# Patient Record
Sex: FEMALE | Race: WHITE | URBAN - METROPOLITAN AREA
[De-identification: names, ages, dates, MRNs, and addresses within clinical notes are randomized per-mention and may not be internally consistent; named-entity substitution may affect disease eponyms.]

---

## 2023-09-20 ENCOUNTER — HOME HEALTH ADMISSION (OUTPATIENT)
Dept: HOME HEALTH SERVICES | Facility: HOME HEALTH | Age: 83
End: 2023-09-20

## 2023-10-13 ENCOUNTER — HOME HEALTH ADMISSION (OUTPATIENT)
Dept: HOME HEALTH SERVICES | Facility: HOME HEALTH | Age: 83
End: 2023-10-13
Payer: MEDICARE

## 2023-10-16 ENCOUNTER — HOME CARE VISIT (OUTPATIENT)
Dept: SCHEDULING | Facility: HOME HEALTH | Age: 83
End: 2023-10-16
Payer: MEDICARE

## 2023-10-16 VITALS
TEMPERATURE: 97.5 F | HEART RATE: 60 BPM | OXYGEN SATURATION: 97 % | RESPIRATION RATE: 18 BRPM | DIASTOLIC BLOOD PRESSURE: 76 MMHG | SYSTOLIC BLOOD PRESSURE: 118 MMHG

## 2023-10-16 VITALS
OXYGEN SATURATION: 97 % | HEART RATE: 60 BPM | DIASTOLIC BLOOD PRESSURE: 76 MMHG | TEMPERATURE: 97.5 F | BODY MASS INDEX: 27.48 KG/M2 | SYSTOLIC BLOOD PRESSURE: 118 MMHG | WEIGHT: 140 LBS | HEIGHT: 60 IN | RESPIRATION RATE: 18 BRPM

## 2023-10-16 PROCEDURE — G0151 HHCP-SERV OF PT,EA 15 MIN: HCPCS

## 2023-10-16 PROCEDURE — G0299 HHS/HOSPICE OF RN EA 15 MIN: HCPCS

## 2023-10-16 ASSESSMENT — ENCOUNTER SYMPTOMS
BOWEL INCONTINENCE: 1
DYSPNEA ACTIVITY LEVEL: AFTER AMBULATING LESS THAN 10 FT
PAIN LOCATION - PAIN QUALITY: ACHY
HEMOPTYSIS: 0

## 2023-10-18 ENCOUNTER — HOME CARE VISIT (OUTPATIENT)
Dept: SCHEDULING | Facility: HOME HEALTH | Age: 83
End: 2023-10-18
Payer: MEDICARE

## 2023-10-18 VITALS
RESPIRATION RATE: 18 BRPM | SYSTOLIC BLOOD PRESSURE: 122 MMHG | DIASTOLIC BLOOD PRESSURE: 68 MMHG | TEMPERATURE: 97.8 F | OXYGEN SATURATION: 94 %

## 2023-10-18 PROCEDURE — G0157 HHC PT ASSISTANT EA 15: HCPCS

## 2023-10-18 ASSESSMENT — ENCOUNTER SYMPTOMS: PAIN LOCATION - PAIN QUALITY: ACHE

## 2023-10-18 NOTE — HOME HEALTH
Pt is an 83 yo RHD female referred to PT s/p pulmonary embolism. Pt was sitting in her WC upon PT arrival and her daughter was present for eval. Pt lives alone in a single story home with one step into shower. PLOF: Pt amb with rollator walker in her home and was I with ADLs. Pt was in the hospital and attended rehab and was readmitted to the hosptal and then back to rehab. Pt presents with forward head, forward lumbar flexion with standing. Pt is non-ambulatory at this time. Pt is mod/max A with sit to stand transfers and presents with increased fatique with today's tests. Pt demonstrates BLE muscle weakness, impaired dynamic balance, limited standing tolerance and decreased endurance causing difficulty performing safe functional transfers from various surfaces throughout home, standing activities and bed mobility without assistance as able to do in PLOF. Tenetti 5 which indicates a high fall risk. Pt is homebound due to being at high risk for falls and requires the assistance of another person to leave home safely. Today's treatment included HEP insturction of LAQ x 20, hip flexion x 20 and modified sit to stand transfer x 10. rolling walker

## 2023-10-19 ENCOUNTER — HOME CARE VISIT (OUTPATIENT)
Dept: SCHEDULING | Facility: HOME HEALTH | Age: 83
End: 2023-10-19
Payer: MEDICARE

## 2023-10-19 VITALS
HEART RATE: 71 BPM | TEMPERATURE: 97.5 F | RESPIRATION RATE: 16 BRPM | OXYGEN SATURATION: 96 % | SYSTOLIC BLOOD PRESSURE: 125 MMHG | DIASTOLIC BLOOD PRESSURE: 67 MMHG

## 2023-10-19 PROCEDURE — G0299 HHS/HOSPICE OF RN EA 15 MIN: HCPCS

## 2023-10-19 ASSESSMENT — ENCOUNTER SYMPTOMS
BOWEL INCONTINENCE: 1
COUGH CHARACTERISTICS: NON-PRODUCTIVE
PAIN LOCATION - PAIN QUALITY: ACHE
COUGH: 1

## 2023-10-20 ENCOUNTER — HOME CARE VISIT (OUTPATIENT)
Dept: HOME HEALTH SERVICES | Facility: HOME HEALTH | Age: 83
End: 2023-10-20
Payer: MEDICARE

## 2023-10-20 PROCEDURE — G0157 HHC PT ASSISTANT EA 15: HCPCS

## 2023-10-20 NOTE — HOME HEALTH
[address] Patient seen today for continued respiratory monitoring (s/p recent PE)     Pt resting in bed with NAD, A/O x 4, c/o minute pain in right thigh (post workout pt states), bilateral calves negative for Yolette's, with no redness heat or swelling, S1 S2 audible, lungs CTA upon auscultation, RA, pt denies any pain/SOB upon inspiration, moist/nonproductive cough present, bowel sounds active x 4 quad ABD which is soft, rounded and non-tender, (BM today) pt is incontinent of B/B, visible skin intact (pt denies any wounds),  pt is currently non ambulatory with weakness in BLE, she utilizes a manual wheelchair         Caregiver involvement: Pt resides 24/7 in home with Daughter Emmett Woo who assists with ADL, Meds, Medical care      Medications reconciled and all medications are available     Education provided this visit: SN educated patient/patient's caregiver on Medication (to include high risk meds i.e., hyperglycemics, anticoagulants, cardiac and CNS affective meds), DM/Hyper/hypo glycemia, risks/causes of pulmonary embolism, SOB, Edema, risk /prevention of pressure injury, safety and fall prevention.      Patient/caregiver verbalize comprehension and demonstrated/taught back on topics     Progress toward goals: progressing well     Home exercise program: PT     Plan for next visit: Eval/Education      Discharge planning was discussed with patient/caregiver: DC when goals are met

## 2023-10-22 VITALS
RESPIRATION RATE: 17 BRPM | SYSTOLIC BLOOD PRESSURE: 142 MMHG | TEMPERATURE: 97.1 F | DIASTOLIC BLOOD PRESSURE: 72 MMHG | HEART RATE: 84 BPM

## 2023-10-23 ENCOUNTER — HOME CARE VISIT (OUTPATIENT)
Dept: SCHEDULING | Facility: HOME HEALTH | Age: 83
End: 2023-10-23
Payer: MEDICARE

## 2023-10-23 VITALS
TEMPERATURE: 97.7 F | RESPIRATION RATE: 17 BRPM | DIASTOLIC BLOOD PRESSURE: 72 MMHG | SYSTOLIC BLOOD PRESSURE: 128 MMHG | OXYGEN SATURATION: 93 % | HEART RATE: 72 BPM

## 2023-10-23 PROCEDURE — G0157 HHC PT ASSISTANT EA 15: HCPCS

## 2023-10-23 PROCEDURE — G0300 HHS/HOSPICE OF LPN EA 15 MIN: HCPCS

## 2023-10-23 ASSESSMENT — ENCOUNTER SYMPTOMS: DYSPNEA ACTIVITY LEVEL: AFTER AMBULATING LESS THAN 10 FT

## 2023-10-23 NOTE — HOME HEALTH
Patient with medication education  Wound Care Provided per care plan. Pt tolerated well. Caregiver involvement: Family involved  Medications reconciled and all medications are available in patient home  Home health supplies by type and quantity ordered/delivered this visit include: n/a  Patient education provided this visit: SN educated patient and patient's caregiver on fall precautions and proper nutrition. Patient and patient caregiver verbalizes understanding via the teach back method. Progress toward goals: progressing well  Home exercise program:   Plan for next visit: education  The following discharge planning was discussed with the patient/ patient's caregiver: DC when goals met. Patient a&ox4. Resting in bed upon arrival. Patient denies pain. Patient denies recent falls. Patient denies new complaints. VSS.

## 2023-10-24 ENCOUNTER — HOME CARE VISIT (OUTPATIENT)
Dept: HOME HEALTH SERVICES | Facility: HOME HEALTH | Age: 83
End: 2023-10-24
Payer: MEDICARE

## 2023-10-24 VITALS
TEMPERATURE: 97.3 F | OXYGEN SATURATION: 94 % | SYSTOLIC BLOOD PRESSURE: 120 MMHG | RESPIRATION RATE: 17 BRPM | DIASTOLIC BLOOD PRESSURE: 70 MMHG

## 2023-10-24 PROCEDURE — G0152 HHCP-SERV OF OT,EA 15 MIN: HCPCS

## 2023-10-26 ENCOUNTER — HOME CARE VISIT (OUTPATIENT)
Dept: SCHEDULING | Facility: HOME HEALTH | Age: 83
End: 2023-10-26
Payer: MEDICARE

## 2023-10-26 VITALS — RESPIRATION RATE: 17 BRPM | HEART RATE: 65 BPM | TEMPERATURE: 97.3 F | OXYGEN SATURATION: 95 %

## 2023-10-26 VITALS — DIASTOLIC BLOOD PRESSURE: 70 MMHG | SYSTOLIC BLOOD PRESSURE: 124 MMHG | RESPIRATION RATE: 17 BRPM | HEART RATE: 75 BPM

## 2023-10-26 PROCEDURE — G0157 HHC PT ASSISTANT EA 15: HCPCS

## 2023-10-26 NOTE — HOME HEALTH
Patient is an 81 y/o female who has multiple medical dx and resides at home with her daughter. She currently reuires up to max assist for LB dressing and assist for transitional movements and transfers as well. She has functional strength but could benefit fron BUE HEP to continue to use UE and continue UE mobility. She has potential to assist more with UB self-care with appropriate education and training. Plan to educate patient and her daughter who is her primary care giver on tasks and activities to encourge functional activities as well as functinal use of hands for self-care. Goals established based on patient and family input to improve function.

## 2023-10-27 ENCOUNTER — HOME CARE VISIT (OUTPATIENT)
Dept: SCHEDULING | Facility: HOME HEALTH | Age: 83
End: 2023-10-27
Payer: MEDICARE

## 2023-10-27 VITALS
SYSTOLIC BLOOD PRESSURE: 132 MMHG | RESPIRATION RATE: 17 BRPM | HEART RATE: 58 BPM | TEMPERATURE: 97.6 F | DIASTOLIC BLOOD PRESSURE: 72 MMHG | OXYGEN SATURATION: 96 %

## 2023-10-27 PROCEDURE — G0152 HHCP-SERV OF OT,EA 15 MIN: HCPCS

## 2023-10-27 PROCEDURE — G0300 HHS/HOSPICE OF LPN EA 15 MIN: HCPCS

## 2023-10-27 PROCEDURE — G0151 HHCP-SERV OF PT,EA 15 MIN: HCPCS

## 2023-10-27 ASSESSMENT — ENCOUNTER SYMPTOMS
COUGH: 1
COUGH CHARACTERISTICS: PRODUCTIVE

## 2023-10-27 NOTE — HOME HEALTH
Patient with education and cough  Wound Care Provided per care plan. Pt tolerated well. Caregiver involvement: family involved  Medications reconciled and all medications are available in patient home. Home health supplies by type and quantity ordered/delivered this visit include: N/a  Patient education provided this visit: SN educated patient and patient's caregiver on how to prevent skin breakdown and fall precautions. Patient and patient caregiver verbalizes understanding via the teach back method. Progress toward goals: progressing well  Home exercise program:   Plan for next visit: education  The following discharge planning was discussed with the patient/ patient's caregiver: DC when goals met. Patient A&OX4, sitting in bed when SN arrived. Patient complains of a cough, productive. Patient has fine crackles in both lower lobes lungs. Called PCP and spoke with Jorge Hernandez who will inform the doctor. Patient denies pain.

## 2023-10-28 VITALS
DIASTOLIC BLOOD PRESSURE: 67 MMHG | HEART RATE: 62 BPM | SYSTOLIC BLOOD PRESSURE: 119 MMHG | RESPIRATION RATE: 17 BRPM | TEMPERATURE: 97.3 F

## 2023-10-28 NOTE — HOME HEALTH
Patient Transitioned from supine to sit with mod assist to manage BLE as well adjusting bed to allow for easier transition. Patient demonstrated ability to sit Edge of bed, with functional balance to manage clothing. Mod assist to doff long sleeve shirt, she pulled it over her head and got stuck. OT educated patient and her caregiver on efficient technique. she donned shirt with min assist.   OT attempted standing tolerance walker level, she was unable to assume standing position. Patient was educated in techniue to transition from sit to stand. Assist to manage LB back into bed from sitting EOB. Patient and caregiver educated on assist for transitional movements and self-care tasks.

## 2023-10-30 VITALS
SYSTOLIC BLOOD PRESSURE: 119 MMHG | OXYGEN SATURATION: 96 % | DIASTOLIC BLOOD PRESSURE: 67 MMHG | TEMPERATURE: 97.3 F | RESPIRATION RATE: 17 BRPM | HEART RATE: 62 BPM

## 2023-10-30 ASSESSMENT — ENCOUNTER SYMPTOMS: PAIN LOCATION - PAIN QUALITY: ACHE

## 2023-10-30 NOTE — HOME HEALTH
S) Pt was in her hospital bed upon PT arrival and pt's daughter was present during RX. O)  A) The patient has been receiving physical therapy treatment for the above-mentioned diagnosis. Despite the consistent effort of the therapy team and excellent family support, the patient has shown zero to minimal improvement during the course of treatment. One of the primary challenges we have observed is the patient's low motivation to actively participate in their rehabilitation program. Progress and Observations:    Functional Status: The patient's functional status has not improved significantly since the initial assessment. There is limited progress in terms of range of motion, strength, and overall mobility. Pain Levels: The patient continues to experience pain and discomfort during daily activities, which affects their willingness to engage in therapy. Home Exercise Program (HEP): The patient has been educated about the importance of performing their HEP daily. However, adherence to the program has been inconsistent, likely due to the lack of motivation. Family Support: The patient's family has been incredibly supportive throughout the treatment process, assisting with transportation and encouragement. They have been actively involved in motivating the patient to engage in therapy. P) Future Plan: Motivational Strategies: Given the patient's low motivation, we will explore and implement motivational techniques such as goal setting, positive reinforcement, and tailored therapy plans. HEP Reinforcement: We will continue to emphasize the significance of the Home Exercise Program and work with the patient and their family to establish a routine that ensures regular adherence. Pain Management: We will reassess the pain management strategies to improve the patient's comfort during therapy sessions and daily life.   [place] with ADLs: Our goal is to continue working on increasing the patient's independence

## 2023-10-31 ENCOUNTER — HOME CARE VISIT (OUTPATIENT)
Dept: HOME HEALTH SERVICES | Facility: HOME HEALTH | Age: 83
End: 2023-10-31
Payer: MEDICARE

## 2023-10-31 PROCEDURE — G0152 HHCP-SERV OF OT,EA 15 MIN: HCPCS

## 2023-11-01 ENCOUNTER — HOME CARE VISIT (OUTPATIENT)
Dept: SCHEDULING | Facility: HOME HEALTH | Age: 83
End: 2023-11-01
Payer: MEDICARE

## 2023-11-01 ENCOUNTER — HOME CARE VISIT (OUTPATIENT)
Dept: HOME HEALTH SERVICES | Facility: HOME HEALTH | Age: 83
End: 2023-11-01
Payer: MEDICARE

## 2023-11-01 VITALS
RESPIRATION RATE: 17 BRPM | HEART RATE: 74 BPM | TEMPERATURE: 97.2 F | SYSTOLIC BLOOD PRESSURE: 123 MMHG | DIASTOLIC BLOOD PRESSURE: 74 MMHG

## 2023-11-01 VITALS
SYSTOLIC BLOOD PRESSURE: 130 MMHG | TEMPERATURE: 97.1 F | HEART RATE: 56 BPM | OXYGEN SATURATION: 95 % | DIASTOLIC BLOOD PRESSURE: 70 MMHG | RESPIRATION RATE: 16 BRPM

## 2023-11-01 VITALS
OXYGEN SATURATION: 95 % | SYSTOLIC BLOOD PRESSURE: 124 MMHG | RESPIRATION RATE: 17 BRPM | DIASTOLIC BLOOD PRESSURE: 76 MMHG | HEART RATE: 71 BPM | TEMPERATURE: 97.6 F

## 2023-11-01 PROCEDURE — G0300 HHS/HOSPICE OF LPN EA 15 MIN: HCPCS

## 2023-11-01 PROCEDURE — G0157 HHC PT ASSISTANT EA 15: HCPCS

## 2023-11-01 NOTE — HOME HEALTH
Patient found laying supine inbed, OT encouraged patient to transition to EOB sitting, She used bed controls and transitionedto side with supervision and encouragement initially, many times she falls back and has to start the process over again. Min assist to manage RLE into position and assit to EOB to assume sitting position, she is able to maintain sitting posture once there. Patient stood for 3 mins 10 seconds while holding arm of w/c OT educated patient and caregiver on proper techniue to transition from sit to stand. REviewed and demonstrated rocking method, rather than attempting to pop up. Teach back method used to ensure clear understanding. She demonstrated ability to doff and thierry her long sleeve pull over shirt with supervision and encouragement. Patient was educated and participated in BUE exercises to establish HEP. Patient completed BUE strengthening exercise with yellow theraband. 2 reps of 10 in all planes.

## 2023-11-02 ENCOUNTER — HOME CARE VISIT (OUTPATIENT)
Dept: HOME HEALTH SERVICES | Facility: HOME HEALTH | Age: 83
End: 2023-11-02
Payer: MEDICARE

## 2023-11-02 VITALS
SYSTOLIC BLOOD PRESSURE: 118 MMHG | TEMPERATURE: 98.3 F | HEART RATE: 67 BPM | DIASTOLIC BLOOD PRESSURE: 82 MMHG | RESPIRATION RATE: 17 BRPM

## 2023-11-02 PROCEDURE — G0152 HHCP-SERV OF OT,EA 15 MIN: HCPCS

## 2023-11-02 NOTE — HOME HEALTH
DC planning with review of HEP, safety awareness, energy conservation all for fall prevention. [facility] explained and signed with verbal conformation of understanding stated. Patient education:    - Energy conservation.    - Safety awareness. - Fall prevention.     - Sleep.    - Nutrition.    - Water intake. - Exercise. - Swelling management through BLE elevation greater than the heart, followed by muscle contraction all to increase lymphatic and venous returned flow. Pressure relief through position change thorough out the hourly encouraged. - Wt shift [id number], change sitting <--> supine.

## 2023-11-02 NOTE — HOME HEALTH
Over the course of treatment OT has addressed ADL retraining to increase patient's active participation in self-care performance. Patient has demonstrated ability to perform grooming and hygiene w/c level as well as supine in bed. She has demonstrated ability to thierry and doff UB clothing ( shirt or blouse) after set-up. Functional mobility has improved with patient transitioning from supine to sit, as well as standing for up to 2-3 minutes. Patient and her daughter have been educated on BUE strengthening exercises and HEP.

## 2023-11-03 ENCOUNTER — HOME CARE VISIT (OUTPATIENT)
Dept: HOME HEALTH SERVICES | Facility: HOME HEALTH | Age: 83
End: 2023-11-03
Payer: MEDICARE

## 2023-11-03 PROCEDURE — G0157 HHC PT ASSISTANT EA 15: HCPCS

## 2023-11-05 VITALS
OXYGEN SATURATION: 97 % | TEMPERATURE: 97.2 F | SYSTOLIC BLOOD PRESSURE: 120 MMHG | HEART RATE: 70 BPM | DIASTOLIC BLOOD PRESSURE: 70 MMHG

## 2023-11-06 ENCOUNTER — HOME CARE VISIT (OUTPATIENT)
Dept: SCHEDULING | Facility: HOME HEALTH | Age: 83
End: 2023-11-06
Payer: MEDICARE

## 2023-11-06 VITALS
DIASTOLIC BLOOD PRESSURE: 68 MMHG | SYSTOLIC BLOOD PRESSURE: 120 MMHG | TEMPERATURE: 97.4 F | OXYGEN SATURATION: 95 % | RESPIRATION RATE: 16 BRPM | HEART RATE: 69 BPM

## 2023-11-06 PROCEDURE — G0157 HHC PT ASSISTANT EA 15: HCPCS

## 2023-11-07 ENCOUNTER — HOME CARE VISIT (OUTPATIENT)
Dept: SCHEDULING | Facility: HOME HEALTH | Age: 83
End: 2023-11-07
Payer: MEDICARE

## 2023-11-07 VITALS
DIASTOLIC BLOOD PRESSURE: 78 MMHG | HEART RATE: 68 BPM | SYSTOLIC BLOOD PRESSURE: 128 MMHG | TEMPERATURE: 98.1 F | OXYGEN SATURATION: 96 % | RESPIRATION RATE: 17 BRPM

## 2023-11-07 PROCEDURE — G0300 HHS/HOSPICE OF LPN EA 15 MIN: HCPCS

## 2023-11-07 NOTE — HOME HEALTH
S)  States that she has been standing for endurance and seated - standing strengthening. O)  2ww ambulation x 15 feet (2). requiring cuing for upright posture, BLE sequencing for ease of activity and efficient movement. Sit <--> stands , standing weight shifts [id number]-[id number]-Left -Right, mini squats, toe-heel raises. A)  Increased ambulatory distance/endurance demonstrated with patient and care giver stating and demonstrating knowledge and compliance with HEP instruction. P)  Continue with current plan of care for increased BLE strengthening and ease of care.

## 2023-11-08 NOTE — HOME HEALTH
Patient with PE   education Care Provided per care plan. Pt tolerated well. Caregiver involvement:  family involved  Medications reconciled and all medications are available in patient home. Home health supplies by type and quantity ordered/delivered this visit include: n/a  Patient education provided this visit: SN educated patient and patient's caregiver on fall precautions and proper nutrition. Patient and patient caregiver verbalizes understanding via the teach back method. Progress toward goals: progressing well  Home exercise program: pt/ot  Plan for next visit: education  The following discharge planning was discussed with the patient/ patient's caregiver: DC when goals met.

## 2023-11-09 ENCOUNTER — HOME CARE VISIT (OUTPATIENT)
Dept: SCHEDULING | Facility: HOME HEALTH | Age: 83
End: 2023-11-09
Payer: MEDICARE

## 2023-11-09 VITALS
TEMPERATURE: 97.1 F | HEART RATE: 64 BPM | SYSTOLIC BLOOD PRESSURE: 120 MMHG | RESPIRATION RATE: 16 BRPM | OXYGEN SATURATION: 94 % | DIASTOLIC BLOOD PRESSURE: 70 MMHG

## 2023-11-09 PROCEDURE — G0157 HHC PT ASSISTANT EA 15: HCPCS

## 2023-11-10 NOTE — HOME HEALTH
Rx:  Emphasis on 2ww ambulation x 20' feet with wheel chair follow , cuing for B UE-LE sequencing.      - Daughter(care giver) present during the session. Sit <--> stands with cuing for nose over toes to prevent retropulsion. Patient education:      - Energy conservation.    - Safety awareness. - Fall prevention.     - Sleep.    - Nutrition.    - Water intake. - Exercise. - Swelling management through BLE elevation greater than the heart, followed by muscle contraction all to increase lymphatic and venous returned flow. Pressure relief through position change thorough out the hourly encouraged. - Wt shift [id number], change sitting <--> supine.

## 2023-11-14 ENCOUNTER — HOME CARE VISIT (OUTPATIENT)
Dept: HOME HEALTH SERVICES | Facility: HOME HEALTH | Age: 83
End: 2023-11-14
Payer: MEDICARE

## 2023-11-16 ENCOUNTER — HOME CARE VISIT (OUTPATIENT)
Dept: SCHEDULING | Facility: HOME HEALTH | Age: 83
End: 2023-11-16
Payer: MEDICARE

## 2023-11-16 VITALS
OXYGEN SATURATION: 93 % | DIASTOLIC BLOOD PRESSURE: 62 MMHG | SYSTOLIC BLOOD PRESSURE: 120 MMHG | HEART RATE: 58 BPM | RESPIRATION RATE: 17 BRPM | TEMPERATURE: 97.4 F

## 2023-11-16 PROCEDURE — G0157 HHC PT ASSISTANT EA 15: HCPCS

## 2023-11-17 NOTE — HOME HEALTH
2ww ambulation x 15' feet (3) requiring cuing for 1) Upright posture, 2) BLE sequencing,  Sit <--> stands with cuing for B UE-LE sequencing and fall prevention and retropulsion prevention. HEP review for grab bar sit <--> stands x 5 (3). BLE strengthening with static hold in the end range.      - QS, GS, AP, SAQ, LAQ, SLR.

## 2023-11-19 ENCOUNTER — HOME CARE VISIT (OUTPATIENT)
Dept: SCHEDULING | Facility: HOME HEALTH | Age: 83
End: 2023-11-19
Payer: MEDICARE

## 2023-11-19 VITALS
TEMPERATURE: 97.2 F | DIASTOLIC BLOOD PRESSURE: 62 MMHG | OXYGEN SATURATION: 96 % | SYSTOLIC BLOOD PRESSURE: 132 MMHG | RESPIRATION RATE: 16 BRPM

## 2023-11-19 PROCEDURE — G0157 HHC PT ASSISTANT EA 15: HCPCS

## 2023-11-21 ENCOUNTER — HOME CARE VISIT (OUTPATIENT)
Dept: SCHEDULING | Facility: HOME HEALTH | Age: 83
End: 2023-11-21
Payer: MEDICARE

## 2023-11-21 VITALS
OXYGEN SATURATION: 94 % | SYSTOLIC BLOOD PRESSURE: 118 MMHG | RESPIRATION RATE: 17 BRPM | HEART RATE: 70 BPM | DIASTOLIC BLOOD PRESSURE: 68 MMHG | TEMPERATURE: 98.4 F

## 2023-11-21 PROCEDURE — G0300 HHS/HOSPICE OF LPN EA 15 MIN: HCPCS

## 2023-11-21 ASSESSMENT — ENCOUNTER SYMPTOMS: HEMOPTYSIS: 0

## 2023-11-21 NOTE — HOME HEALTH
Patient with hx PE  Education Provided per care plan. Pt tolerated well. Caregiver involvement: family and sridevi involved. Medications reconciled and all medications are available in patient home. Home health supplies by type and quantity ordered/delivered this visit include: n/a  Patient education provided this visit: SN educated patient and patient's caregiver on proper nutrition and fall precautions. Patient and patient caregiver verbalizes understanding via the teach back method. Progress toward goals: progressing well  Home exercise program: pt/ot  Plan for next visit: Education  The following discharge planning was discussed with the patient/ patient's caregiver: DC when goals met.

## 2023-11-27 ENCOUNTER — HOME CARE VISIT (OUTPATIENT)
Dept: SCHEDULING | Facility: HOME HEALTH | Age: 83
End: 2023-11-27
Payer: MEDICARE

## 2023-11-27 VITALS
HEART RATE: 76 BPM | DIASTOLIC BLOOD PRESSURE: 70 MMHG | RESPIRATION RATE: 17 BRPM | TEMPERATURE: 98.7 F | SYSTOLIC BLOOD PRESSURE: 124 MMHG | OXYGEN SATURATION: 94 %

## 2023-11-27 PROCEDURE — G0300 HHS/HOSPICE OF LPN EA 15 MIN: HCPCS

## 2023-11-27 ASSESSMENT — ENCOUNTER SYMPTOMS: HEMOPTYSIS: 0

## 2023-11-27 NOTE — HOME HEALTH
Patient with PE, DMII  Education Provided per care plan. Pt tolerated well. Caregiver involvement: family involved  Medications reconciled and all medications are available in patient home. Home health supplies by type and quantity ordered/delivered this visit include: n/a  Patient education provided this visit: SN educated patient and patient's caregiver on proper nutrition and fall precautions. Patient and patient caregiver verbalizes understanding via the teach back method. Progress toward goals: progressing well  Home exercise program: pt/ot  Plan for next visit: education f/u  The following discharge planning was discussed with the patient/ patient's caregiver: DC when goals met.

## 2023-11-29 ENCOUNTER — HOME CARE VISIT (OUTPATIENT)
Dept: SCHEDULING | Facility: HOME HEALTH | Age: 83
End: 2023-11-29
Payer: MEDICARE

## 2023-11-29 VITALS
OXYGEN SATURATION: 98 % | TEMPERATURE: 97.5 F | SYSTOLIC BLOOD PRESSURE: 118 MMHG | DIASTOLIC BLOOD PRESSURE: 70 MMHG | RESPIRATION RATE: 16 BRPM | HEART RATE: 56 BPM

## 2023-11-29 PROCEDURE — G0157 HHC PT ASSISTANT EA 15: HCPCS

## 2023-12-07 ENCOUNTER — HOME CARE VISIT (OUTPATIENT)
Dept: SCHEDULING | Facility: HOME HEALTH | Age: 83
End: 2023-12-07
Payer: MEDICARE

## 2023-12-07 PROCEDURE — G0151 HHCP-SERV OF PT,EA 15 MIN: HCPCS

## 2023-12-08 ENCOUNTER — HOME CARE VISIT (OUTPATIENT)
Dept: SCHEDULING | Facility: HOME HEALTH | Age: 83
End: 2023-12-08
Payer: MEDICARE

## 2023-12-08 VITALS
HEART RATE: 75 BPM | RESPIRATION RATE: 16 BRPM | SYSTOLIC BLOOD PRESSURE: 126 MMHG | OXYGEN SATURATION: 97 % | DIASTOLIC BLOOD PRESSURE: 74 MMHG | TEMPERATURE: 97.3 F

## 2023-12-08 PROCEDURE — G0299 HHS/HOSPICE OF RN EA 15 MIN: HCPCS

## 2023-12-08 ASSESSMENT — ENCOUNTER SYMPTOMS: DYSPNEA ACTIVITY LEVEL: AFTER AMBULATING MORE THAN 20 FT

## 2023-12-08 NOTE — HOME HEALTH
Patient DC from [address] [place] [facility] with all goals met. SN provided education on HTN, Bleeding, DM s/s of infection,  and medication management. Patient and caregiver verbalized understanding of education provided and when to call MD. PT/OT provided therapy, patient tolerated well.  Patient d/c with all goals met  Pt is moving to [place] with her daughter

## 2023-12-09 VITALS
TEMPERATURE: 97.2 F | RESPIRATION RATE: 17 BRPM | OXYGEN SATURATION: 95 % | HEART RATE: 61 BPM | SYSTOLIC BLOOD PRESSURE: 118 MMHG | DIASTOLIC BLOOD PRESSURE: 74 MMHG

## 2023-12-09 ASSESSMENT — ENCOUNTER SYMPTOMS: PAIN LOCATION - PAIN QUALITY: ACHE

## 2023-12-09 NOTE — HOME HEALTH
Pt was sitting in her WC upon PT arrival and her daughter was present. Pt and daughter state she has gotten much better and will be moving to [place] next week. Pt is able to ambulate 10'x2 with 2ww. Pt is mod I with bed and chair transfers. Pt verbalized compliance with HEP.  Encouraged pt to continue with HEP and to get a PCP appointment as soon as they move to [facility] and continue PT.